# Patient Record
Sex: MALE | Race: WHITE | NOT HISPANIC OR LATINO | Employment: OTHER | URBAN - METROPOLITAN AREA
[De-identification: names, ages, dates, MRNs, and addresses within clinical notes are randomized per-mention and may not be internally consistent; named-entity substitution may affect disease eponyms.]

---

## 2017-02-09 ENCOUNTER — LAB VISIT (OUTPATIENT)
Dept: LAB | Facility: HOSPITAL | Age: 82
End: 2017-02-09
Attending: RADIOLOGY
Payer: MEDICARE

## 2017-02-09 DIAGNOSIS — Z85.46 HISTORY OF PROSTATE CANCER: ICD-10-CM

## 2017-02-09 LAB — COMPLEXED PSA SERPL-MCNC: 0.01 NG/ML

## 2017-02-09 PROCEDURE — 36415 COLL VENOUS BLD VENIPUNCTURE: CPT | Mod: PO

## 2017-02-09 PROCEDURE — 84153 ASSAY OF PSA TOTAL: CPT

## 2017-02-16 ENCOUNTER — OFFICE VISIT (OUTPATIENT)
Dept: RADIATION ONCOLOGY | Facility: CLINIC | Age: 82
End: 2017-02-16
Payer: MEDICARE

## 2017-02-16 VITALS
DIASTOLIC BLOOD PRESSURE: 63 MMHG | HEIGHT: 70 IN | SYSTOLIC BLOOD PRESSURE: 137 MMHG | HEART RATE: 67 BPM | RESPIRATION RATE: 16 BRPM | WEIGHT: 169.06 LBS | BODY MASS INDEX: 24.2 KG/M2

## 2017-02-16 DIAGNOSIS — Z85.46 HISTORY OF PROSTATE CANCER: Primary | ICD-10-CM

## 2017-02-16 PROCEDURE — 1157F ADVNC CARE PLAN IN RCRD: CPT | Mod: S$GLB,,, | Performed by: RADIOLOGY

## 2017-02-16 PROCEDURE — 1160F RVW MEDS BY RX/DR IN RCRD: CPT | Mod: S$GLB,,, | Performed by: RADIOLOGY

## 2017-02-16 PROCEDURE — 99999 PR PBB SHADOW E&M-EST. PATIENT-LVL III: CPT | Mod: PBBFAC,,, | Performed by: RADIOLOGY

## 2017-02-16 PROCEDURE — 1126F AMNT PAIN NOTED NONE PRSNT: CPT | Mod: S$GLB,,, | Performed by: RADIOLOGY

## 2017-02-16 PROCEDURE — 1159F MED LIST DOCD IN RCRD: CPT | Mod: S$GLB,,, | Performed by: RADIOLOGY

## 2017-02-16 PROCEDURE — 99213 OFFICE O/P EST LOW 20 MIN: CPT | Mod: S$GLB,,, | Performed by: RADIOLOGY

## 2017-02-16 RX ORDER — OMEPRAZOLE 40 MG/1
CAPSULE, DELAYED RELEASE ORAL
COMMUNITY
Start: 2017-01-18

## 2017-02-16 RX ORDER — ASPIRIN 325 MG
325 TABLET ORAL DAILY
COMMUNITY

## 2017-02-16 NOTE — MR AVS SNAPSHOT
[facility] - Radiation Oncology  [address]  [facility] [address]  Phone: [phone]                  Diego Pimentel   2017 1:30 PM   Office Visit    Description:  Male : 1928   Provider:  Renato Danielle Jr., MD   Department:  [facility]           Reason for Visit     Prostate Cancer           Diagnoses this Visit        Comments    History of prostate cancer    -  Primary            To Do List           Future Appointments        Provider Department Dept Phone    2017 1:30 PM Renato Danielle Jr., MD [facility] - Radiation Oncology [phone]      Goals (5 Years of Data)     None      [facility]     [facility] On Call Nurse Care Line -  Assistance  Registered nurses in the [facility] Center provide clinical advisement, health education, appointment booking, and other advisory services.  Call for this free service at [phone].             Medications           Message regarding Medications     Verify the changes and/or additions to your medication regime listed below are the same as discussed with your clinician today.  If any of these changes or additions are incorrect, please notify your healthcare provider.        STOP taking these medications     aspirin (ECOTRIN) 81 MG EC tablet Take 81 mg by mouth once daily.           Verify that the below list of medications is an accurate representation of the medications you are currently taking.  If none reported, the list may be blank. If incorrect, please contact your healthcare provider. Carry this list with you in case of emergency.           Current Medications     aspirin 325 MG tablet Take 325 mg by mouth once daily.    clopidogrel (PLAVIX) 75 mg tablet Take 75 mg by mouth once daily.    cyanocobalamin (VITAMIN B-12) 1000 MCG tablet Take 100 mcg by mouth once daily.    ferrous sulfate (FERROUSUL) 325 mg (65 mg iron) Tab Take 325 mg by mouth 2 (two) times daily.    FLUVIRIN 9827-2194 45 mcg (15  "mcg x 3)/0.5 mL Susp     metoprolol succinate (TOPROL-XL) 25 MG 24 hr tablet Take 25 mg by mouth 2 (two) times daily.    metoprolol tartrate (LOPRESSOR) 25 MG tablet     nitroGLYCERIN (NITROSTAT) 0.3 MG SL tablet Place 0.3 mg under the tongue every 5 (five) minutes as needed.    nitroGLYCERIN 0.2 mg/hr TD PT24 (NITRODUR) 0.2 mg/hr Place 1 patch onto the skin once daily.    omeprazole (PRILOSEC) 40 MG capsule     simvastatin (ZOCOR) 40 MG tablet Take 40 mg by mouth every evening.           Clinical Reference Information           Your Vitals Were     BP Pulse Resp Height Weight BMI    137/63 (BP Location: Right arm, Patient Position: Sitting, BP Method: Automatic) 67 16 5' 10" (1.778 m) 76.7 kg (169 lb 1.5 oz) 24.26 kg/m2      Blood Pressure          Most Recent Value    BP  137/63      Allergies as of 2/16/2017     No Known Allergies      Immunizations Administered on Date of Encounter - 2/16/2017     None      Orders Placed During Today's Visit     Future Labs/Procedures Expected by Expires    Prostate Specific Antigen, Diagnostic  2/16/2018 4/17/2018      Language Assistance Services     ATTENTION: Language assistance services are available, free of charge. Please call [phone].      ATENCIÓN: Si nataliala marnie, tiene a valerio disposición servicios gratuitos de asistencia lingüística. Llame al [phone].     VONNIE Ý: N?u b?n nói Ti?ng Vi?t, có các d?ch v? h? tr? ngôn ng? mi?n phí dành cho b?n. G?i s? [phone].         [facility] complies with applicable Federal civil rights laws and does not discriminate on the basis of race, color, national origin, age, disability, or sex.        "

## 2017-02-16 NOTE — PROGRESS NOTES
Subjective:       Patient ID: Diego Pimentel is a 88 y.o. male.    Chief Complaint: Prostate Cancer (1yr f/u;psa)    HPI Comments: This patient returns today for his annual follow up visit.    Mr. 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 The patient's initial PSA was 3.2 ng/ml. Given the presence of high risk disease, the patient was enrolled on RTOG protocol 9902 and randomized to receive radiotherapy with 2 years of hormonal ablation. The patient began hormonal ablation with Zoladex and Casodex in December of 2003. He completed a course of external beam radiotherapy in April of 2004. He completed his hormonal therapy in January of 2006. The patient has remained VERÓNICA since that time.  No  complaints of incontinence, or hematuria.  nocturia at once per night.  No urinary hesitancy.      Review of Systems   Constitutional: Negative for activity change, appetite change, diaphoresis and fever.   Respiratory: Negative for cough and shortness of breath.    Cardiovascular: Negative for chest pain.   Gastrointestinal: Negative for abdominal pain, blood in stool and constipation.   Genitourinary: Negative for difficulty urinating, dysuria, flank pain, frequency and hematuria.       Objective:      Physical Exam   Constitutional: He appears well-developed and well-nourished. No distress.   Genitourinary:   Genitourinary Comments: rectal - no palpable masses or induration.        PSA 0.01 ng/ml  Assessment:       1. History of prostate cancer        Plan:       Clinically the patient remains VERÓNICA.  Plan follow up in 1 year with psa.

## 2018-02-16 ENCOUNTER — LAB VISIT (OUTPATIENT)
Dept: LAB | Facility: HOSPITAL | Age: 83
End: 2018-02-16
Attending: RADIOLOGY
Payer: MEDICARE

## 2018-02-16 DIAGNOSIS — Z85.46 HISTORY OF PROSTATE CANCER: ICD-10-CM

## 2018-02-16 LAB — COMPLEXED PSA SERPL-MCNC: 0.02 NG/ML

## 2018-02-16 PROCEDURE — 84153 ASSAY OF PSA TOTAL: CPT

## 2018-02-16 PROCEDURE — 36415 COLL VENOUS BLD VENIPUNCTURE: CPT | Mod: PO

## 2018-02-21 ENCOUNTER — TELEPHONE (OUTPATIENT)
Dept: RADIATION ONCOLOGY | Facility: CLINIC | Age: 83
End: 2018-02-21

## 2019-01-18 DIAGNOSIS — Z85.46 HISTORY OF PROSTATE CANCER: Primary | ICD-10-CM
